# Patient Record
Sex: FEMALE | Race: BLACK OR AFRICAN AMERICAN | NOT HISPANIC OR LATINO | URBAN - METROPOLITAN AREA
[De-identification: names, ages, dates, MRNs, and addresses within clinical notes are randomized per-mention and may not be internally consistent; named-entity substitution may affect disease eponyms.]

---

## 2017-11-14 ENCOUNTER — EMERGENCY (EMERGENCY)
Facility: HOSPITAL | Age: 57
LOS: 1 days | Discharge: HOME | End: 2017-11-14
Attending: EMERGENCY MEDICINE
Payer: SELF-PAY

## 2017-11-14 VITALS
SYSTOLIC BLOOD PRESSURE: 160 MMHG | DIASTOLIC BLOOD PRESSURE: 98 MMHG | HEART RATE: 80 BPM | RESPIRATION RATE: 19 BRPM | OXYGEN SATURATION: 97 %

## 2017-11-14 VITALS
OXYGEN SATURATION: 95 % | SYSTOLIC BLOOD PRESSURE: 169 MMHG | DIASTOLIC BLOOD PRESSURE: 104 MMHG | TEMPERATURE: 99 F | HEART RATE: 98 BPM | RESPIRATION RATE: 18 BRPM

## 2017-11-14 PROCEDURE — 99284 EMERGENCY DEPT VISIT MOD MDM: CPT

## 2017-11-14 PROCEDURE — 99283 EMERGENCY DEPT VISIT LOW MDM: CPT

## 2017-11-14 RX ORDER — ACETAMINOPHEN 500 MG
650 TABLET ORAL ONCE
Refills: 0 | Status: COMPLETED | OUTPATIENT
Start: 2017-11-14 | End: 2017-11-14

## 2017-11-14 RX ORDER — IBUPROFEN 200 MG
600 TABLET ORAL ONCE
Refills: 0 | Status: COMPLETED | OUTPATIENT
Start: 2017-11-14 | End: 2017-11-14

## 2017-11-14 RX ADMIN — Medication 600 MILLIGRAM(S): at 22:18

## 2017-11-14 RX ADMIN — Medication 650 MILLIGRAM(S): at 22:18
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
## 2017-11-14 NOTE — ED PROVIDER NOTE - NS ED ROS FT
Constitutional: No fever or chills  Eyes: No visual changes, eye pain or redness  HEENT: No throat pain, ear pain, nasal pain. No nose bleeding.  CV: No chest pain or lower extremity edema  Resp: No SOB no cough  GI: No abd pain. No nausea or vomiting. No diarrhea. No constipation.   : No dysuria, hematuria.   MSK: No musculoskeletal pain  Skin: No rash  Neuro: + headache. No numbness or tingling. No weakness.

## 2017-11-14 NOTE — ED PROVIDER NOTE - PLAN OF CARE
1. Take tylenol or motrin as directed as needed for pain   2. Hydrate yourself well throughout the day   3. Follow-up with your primary physician this week for reevaluation   4. Return to the ER for any new or worsening symptoms
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
## 2017-11-14 NOTE — ED PROVIDER NOTE - PROGRESS NOTE DETAILS
patient feeling better and wishes to go home at this time. will provide a work note and instructions to return. -Irma Dexter PA-C The patient was re-examined after interventions and is feeling better.  The patient will follow up with their primary physician this week.
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
## 2017-11-14 NOTE — ED ADULT NURSE NOTE - CHPI ED SYMPTOMS NEG
no back pain/no disorientation/no dizziness/no laceration/no loss of consciousness/no neck tenderness/no bruising/no crying/no decreased eating/drinking/no difficulty bearing weight/no fussiness/no sleeping issues
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
## 2020-04-09 NOTE — ED ADULT NURSE NOTE - MUSCULOSKELETAL ASSESSMENT
Labs ordered  A1c goal < 7.0  Monofilament exam up to date  Feet self assessment  Eye exam recommended yearly  Discussed healthy diabetic diet  Cont current medications   WDL

## 2021-10-14 ENCOUNTER — EMERGENCY (EMERGENCY)
Facility: HOSPITAL | Age: 61
LOS: 0 days | Discharge: HOME | End: 2021-10-14
Payer: MEDICAID

## 2021-10-14 VITALS
DIASTOLIC BLOOD PRESSURE: 93 MMHG | HEIGHT: 66 IN | TEMPERATURE: 98 F | HEART RATE: 75 BPM | RESPIRATION RATE: 15 BRPM | OXYGEN SATURATION: 96 % | SYSTOLIC BLOOD PRESSURE: 160 MMHG | WEIGHT: 207.01 LBS

## 2021-10-14 VITALS
OXYGEN SATURATION: 97 % | HEART RATE: 65 BPM | SYSTOLIC BLOOD PRESSURE: 151 MMHG | TEMPERATURE: 98 F | RESPIRATION RATE: 16 BRPM | DIASTOLIC BLOOD PRESSURE: 88 MMHG
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
## 2021-10-14 DIAGNOSIS — Z88.1 ALLERGY STATUS TO OTHER ANTIBIOTIC AGENTS STATUS: ICD-10-CM

## 2021-10-14 DIAGNOSIS — I10 ESSENTIAL (PRIMARY) HYPERTENSION: ICD-10-CM

## 2021-10-14 DIAGNOSIS — M54.50 LOW BACK PAIN, UNSPECIFIED: ICD-10-CM
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
## 2021-10-14 DIAGNOSIS — E03.9 HYPOTHYROIDISM, UNSPECIFIED: ICD-10-CM

## 2021-10-14 DIAGNOSIS — E78.00 PURE HYPERCHOLESTEROLEMIA, UNSPECIFIED: ICD-10-CM

## 2021-10-14 PROCEDURE — 72100 X-RAY EXAM L-S SPINE 2/3 VWS: CPT | Mod: 26

## 2021-10-14 PROCEDURE — 99284 EMERGENCY DEPT VISIT MOD MDM: CPT

## 2021-10-14 RX ORDER — IBUPROFEN 200 MG
1 TABLET ORAL
Qty: 28 | Refills: 0
Start: 2021-10-14 | End: 2021-10-20

## 2021-10-14 RX ORDER — IBUPROFEN 200 MG
400 TABLET ORAL ONCE
Refills: 0 | Status: COMPLETED | OUTPATIENT
Start: 2021-10-14 | End: 2021-10-14

## 2021-10-14 RX ORDER — METHOCARBAMOL 500 MG/1
1 TABLET, FILM COATED ORAL
Qty: 9 | Refills: 0
Start: 2021-10-14 | End: 2021-10-16

## 2021-10-14 RX ADMIN — Medication 400 MILLIGRAM(S): at 22:05

## 2021-10-14 RX ADMIN — Medication 400 MILLIGRAM(S): at 22:35

## 2021-10-14 NOTE — ED PROVIDER NOTE - PHYSICAL EXAMINATION
General appearance AAox3 nontoxic looking afebrile sitting in bed in NAD, breathing comfortably, speak full sentence, no tripoding  Head : NCAT, no facial swelling, no bruise, no laceration, non TTP,  EYE : b/l eyes EOMI/PERRL, no nystagmus.   Mouth : Oropharynx moist and patent, no swelling, no edema, no tongue swelling, uvula midline. no exudate, no rash, no lesion   Neck : no cervical/paracervical spine tenderness, no meningeal signs, no neck rigidity, no lymph node swelling. FROM, no body step off  chest : no bruise, no deformity, non TTP, chest expanding equally b/l, no crepitus, no swelling,   lung : CTAB no w/r/r  abdomen : soft non tender, no distended, + BS x 4 quadrant, no guarding, no CVA tenderness, no organomegaly  spine : right paralumbar spine ttp, no central spine ttp, skin intact, dry not warm to touch. no bruise, no deformity, no bony step off, no erythema, no swelling, no infection. non TTP. FROM, no sensory deficit, distal pulse intact, cap refill < 2 second, able to bare weight. strength 5/5   extremity :  skin dry not warm to touch. no bruise, no deformity, no bony step off, no infection. FROM, no sensory deficit, distal pulse intact, cap refill < 2 second, able to bare weight. ambulating steadily, strength 5/5
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
## 2021-10-14 NOTE — ED PROVIDER NOTE - NSFOLLOWUPCLINICSTOKEN_GEN_ALL_ED_FT
[id number]: || ||00\01||False;[id number]: || ||00\01||False;[id number]: || ||00\01||False;[id number]: || ||00\01||False;

## 2021-10-14 NOTE — ED PROVIDER NOTE - NSFOLLOWUPCLINICS_GEN_ALL_ED_FT
[facility] - Primary Care  Primary Care  [address] [place] [address]  Phone: [phone]  Fax:     [facility]  Orthopedic Surgery  [address], 3rd & [address]th [place] [address]  Phone: [phone]  Fax:     [facility]  Orthopedic Surgery  [address]  [place] [address]  Phone: [phone]  Fax:     [facility]  Orthopedic Surgery  [address] [place] [address]  Phone: [phone]  Fax:

## 2021-10-14 NOTE — ED PROVIDER NOTE - PATIENT PORTAL LINK FT
You can access the FollowMyHealth Patient Portal offered by [facility] by registering at the following website: [email or web address]. By joining [facility]’s FollowMyHealth portal, you will also be able to view your health information using other applications (apps) compatible with our system.

## 2021-10-14 NOTE — ED ADULT NURSE NOTE - NSIMPLEMENTINTERV_GEN_ALL_ED
Implemented All Fall Risk Interventions:  [place] to call system. Call bell, personal items and telephone within reach. Instruct patient to call for assistance. Room bathroom lighting operational. Non-slip footwear when patient is off stretcher. Physically safe environment: no spills, clutter or unnecessary equipment. Stretcher in lowest position, wheels locked, appropriate side rails in place. Provide visual cue, wrist band, yellow gown, etc. Monitor gait and stability. Monitor for mental status changes and reorient to person, place, and time. Review medications for side effects contributing to fall risk. Reinforce activity limits and safety measures with patient and family.
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
## 2022-04-27 RX ORDER — LISINOPRIL 2.5 MG/1
0 TABLET ORAL
Qty: 0 | Refills: 0 | DISCHARGE

## 2022-04-27 RX ORDER — SIMVASTATIN 20 MG/1
0 TABLET, FILM COATED ORAL
Qty: 0 | Refills: 0 | DISCHARGE

## 2022-04-27 RX ORDER — NIFEDIPINE 30 MG
0 TABLET, EXTENDED RELEASE 24 HR ORAL
Qty: 0 | Refills: 0 | DISCHARGE

## 2022-04-27 RX ORDER — LEVOTHYROXINE SODIUM 125 MCG
0 TABLET ORAL
Qty: 0 | Refills: 0 | DISCHARGE

## 2025-07-05 NOTE — ED ADULT TRIAGE NOTE - STATUS:
1900 - 0730  Diagnosis: Left foot cellulitis w/ dry gangrene and possible osteomyelitis; PAD s/p bilateral femoral to below-knee popliteal artery bypass;Post Right common femoral to left profundus femoral artegraft bypass on 7/2/25 by Dr. Delgadillo  POD#: 3  Mental Status: 3; Disoriented to   Activity/Dangle: A1 GB/W  Diet: Regular  Boyd/Voiding: Incontinent at times; Up to BR   O2/LDA: RA; PIV SL  DC Date: TBD  Other Information: VSS on RA. NPO since 0000. CHG bath X 1 overnight.Partial amputation today 7/5/25. Bilateral groin sites CDI, L toe gangrene   Intact